# Patient Record
Sex: MALE | Employment: OTHER
[De-identification: names, ages, dates, MRNs, and addresses within clinical notes are randomized per-mention and may not be internally consistent; named-entity substitution may affect disease eponyms.]

---

## 2024-05-30 ENCOUNTER — TELEPHONE (OUTPATIENT)
Dept: SLEEP MEDICINE | Facility: HOSPITAL | Age: 29
End: 2024-05-30

## 2024-05-30 ENCOUNTER — OFFICE VISIT (OUTPATIENT)
Age: 29
End: 2024-05-30
Payer: OTHER GOVERNMENT

## 2024-05-30 VITALS
SYSTOLIC BLOOD PRESSURE: 150 MMHG | DIASTOLIC BLOOD PRESSURE: 60 MMHG | TEMPERATURE: 97 F | RESPIRATION RATE: 18 BRPM | HEART RATE: 68 BPM | HEIGHT: 77 IN | WEIGHT: 251 LBS | OXYGEN SATURATION: 99 % | BODY MASS INDEX: 29.64 KG/M2

## 2024-05-30 DIAGNOSIS — G47.19 EXCESSIVE DAYTIME SLEEPINESS: ICD-10-CM

## 2024-05-30 DIAGNOSIS — R06.83 LOUD SNORING: ICD-10-CM

## 2024-05-30 DIAGNOSIS — R03.0 ELEVATED BLOOD PRESSURE READING: ICD-10-CM

## 2024-05-30 DIAGNOSIS — G47.00 INSOMNIA W/ SLEEP APNEA: ICD-10-CM

## 2024-05-30 DIAGNOSIS — R35.1 NOCTURIA: ICD-10-CM

## 2024-05-30 DIAGNOSIS — R73.03 PREDIABETES: ICD-10-CM

## 2024-05-30 DIAGNOSIS — G47.30 INSOMNIA W/ SLEEP APNEA: ICD-10-CM

## 2024-05-30 DIAGNOSIS — R29.818 SUSPECTED SLEEP APNEA: Primary | ICD-10-CM

## 2024-05-30 PROCEDURE — 99204 OFFICE O/P NEW MOD 45 MIN: CPT | Performed by: OTOLARYNGOLOGY

## 2024-05-30 RX ORDER — NAPROXEN 500 MG/1
TABLET ORAL
COMMUNITY
Start: 2023-12-13 | End: 2024-12-12

## 2024-05-30 RX ORDER — RIZATRIPTAN BENZOATE 10 MG/1
TABLET ORAL
COMMUNITY
Start: 2023-12-13 | End: 2024-12-12

## 2024-05-30 RX ORDER — SOD CHLOR,BICARB/SQUEEZ BOTTLE
PACKET, WITH RINSE DEVICE NASAL
COMMUNITY
Start: 2024-05-17

## 2024-05-30 RX ORDER — FEXOFENADINE HCL 180 MG/1
TABLET ORAL
COMMUNITY
Start: 2023-09-19 | End: 2024-10-26

## 2024-05-30 RX ORDER — AZELASTINE 1 MG/ML
SPRAY, METERED NASAL
COMMUNITY
Start: 2024-05-08 | End: 2025-05-08

## 2024-05-30 RX ORDER — FLUTICASONE PROPIONATE 50 MCG
SPRAY, SUSPENSION (ML) NASAL
COMMUNITY
Start: 2023-10-27 | End: 2024-10-26

## 2024-05-30 ASSESSMENT — SLEEP AND FATIGUE QUESTIONNAIRES
HOW LIKELY ARE YOU TO NOD OFF OR FALL ASLEEP WHILE WATCHING TV: HIGH CHANCE OF DOZING
HOW LIKELY ARE YOU TO NOD OFF OR FALL ASLEEP WHILE SITTING AND READING: HIGH CHANCE OF DOZING
HOW LIKELY ARE YOU TO NOD OFF OR FALL ASLEEP WHILE SITTING INACTIVE IN A PUBLIC PLACE: HIGH CHANCE OF DOZING
HOW LIKELY ARE YOU TO NOD OFF OR FALL ASLEEP IN A CAR, WHILE STOPPED FOR A FEW MINUTES IN TRAFFIC: HIGH CHANCE OF DOZING
HOW LIKELY ARE YOU TO NOD OFF OR FALL ASLEEP WHEN YOU ARE A PASSENGER IN A CAR FOR AN HOUR WITHOUT A BREAK: HIGH CHANCE OF DOZING
HOW LIKELY ARE YOU TO NOD OFF OR FALL ASLEEP WHILE LYING DOWN TO REST IN THE AFTERNOON WHEN CIRCUMSTANCES PERMIT: HIGH CHANCE OF DOZING
NECK CIRCUMFERENCE (INCHES): 15
HOW LIKELY ARE YOU TO NOD OFF OR FALL ASLEEP WHILE SITTING AND TALKING TO SOMEONE: WOULD NEVER DOZE
ESS TOTAL SCORE: 21
HOW LIKELY ARE YOU TO NOD OFF OR FALL ASLEEP WHILE SITTING QUIETLY AFTER LUNCH WITHOUT ALCOHOL: HIGH CHANCE OF DOZING

## 2024-05-30 ASSESSMENT — PATIENT HEALTH QUESTIONNAIRE - PHQ9
SUM OF ALL RESPONSES TO PHQ QUESTIONS 1-9: 0
SUM OF ALL RESPONSES TO PHQ9 QUESTIONS 1 & 2: 0
2. FEELING DOWN, DEPRESSED OR HOPELESS: NOT AT ALL
SUM OF ALL RESPONSES TO PHQ QUESTIONS 1-9: 0
1. LITTLE INTEREST OR PLEASURE IN DOING THINGS: NOT AT ALL
SUM OF ALL RESPONSES TO PHQ QUESTIONS 1-9: 0
SUM OF ALL RESPONSES TO PHQ QUESTIONS 1-9: 0

## 2024-05-30 NOTE — PROGRESS NOTES
pap smears or colon screening. no    Medication list has been updated according to patient.  
spent another 5 to 10 minutes writing a comprehensive note.  I also ordered a home sleep apnea test to evaluate for yet undiagnosed but suspected moderate to severe obstructive sleep apnea.    This note was dictated utilizing voice recognition software which may lead to typographical errors.  I apologize in advance if the situation occurs.  If questions arise please do not hesitate to contact me or call our department.    Electronically signed by Kiah Harding DO on5/30/2024 at 1:24 PM

## 2024-05-30 NOTE — ASSESSMENT & PLAN NOTE
obviously this could be due to untreated moderate to severe obstructive sleep apnea but also shiftwork sleep disorder.  His shift does very for 2 months at a time and it takes him a while to fall asleep even with an [place] sleep score reportedly of 21.  I had him fill out the [place] sleep score by himself after I was done speaking to him and his score was 18 at that point.

## 2024-05-30 NOTE — ASSESSMENT & PLAN NOTE
interestingly, even after having an [place] somewhere between 18 and 21 it may take him 40 minutes or so to fall asleep when he goes to bed at 2 or 3 in the morning.  He does not \"work out\" after he gets done with his shift which we need to revisit as this may not be a great idea.  Additionally, sleep onset insomnia will improve a lot of times when treating moderate to severe obstructive sleep apnea.  He also could have his shiftwork sleep disorder but he should be able to fall asleep faster than 40 minutes at 2:00 in the morning based on circadian rhythm.    Will need to revisit this in follow-up

## 2024-05-30 NOTE — PATIENT INSTRUCTIONS
Please make a follow up appointment to discuss the results of your sleep study. If this is impossible for some reason, please send me a \"My Chart\" message so that I may get back with you in a timely manner.    The [facility] is located in the [facility], adjacent to [facility]. The lab is on the second floor. The direct number to call for sleep study related questions is: [phone].    Please call our clinic back at [phone] or send a message on [employer] if you have any questions or concerns or if you are experiencing any of the following:     You have not received a follow up appointment within 30 days prior the recommended follow up time.    If you are not tolerating treatment plan and/or not able to obtain equipment or prescribed medication(s).  if you are experiencing any difficulties with the Durable Medical Equipment  (DME) Company you may be using or is assigned to you.  Two weeks have passed and you have not received an appointment for a scheduled procedure.  Two weeks have passed since you underwent a test and/or procedure and you have not received your results.     If you are using a CPAP/BIPAP, or Home Ventilator Device- Please note the following.  Currently, many DMEs are experiencing supply chain difficulties and orders for equipment may be back logged several weeks.     Your  Durable Medical Equipment (DME ) company is supposed to provide you with replacement filters, tubing and masks. You can either call your DME when you need new supplies or you can arrange for an automatic shipment schedule.    Your need to be seen by our office at lat minimum of every 12 months in order to renew the prescription for these supplies.   Please make note of who your DME company is and their phone number.   Please make sure that you clean your mask and hosing on a regular basis.  Your DME can provide you with additional information regarding proper care and cleaning of your

## 2024-06-03 ENCOUNTER — TELEPHONE (OUTPATIENT)
Dept: SLEEP MEDICINE | Facility: HOSPITAL | Age: 29
End: 2024-06-03

## 2024-06-04 ENCOUNTER — TELEPHONE (OUTPATIENT)
Dept: SLEEP MEDICINE | Facility: HOSPITAL | Age: 29
End: 2024-06-04

## 2024-06-10 ENCOUNTER — TELEPHONE (OUTPATIENT)
Dept: SLEEP MEDICINE | Facility: HOSPITAL | Age: 29
End: 2024-06-10

## 2024-06-17 ENCOUNTER — HOSPITAL ENCOUNTER (OUTPATIENT)
Dept: SLEEP MEDICINE | Facility: HOSPITAL | Age: 29
Discharge: HOME | End: 2024-06-20
Attending: OTOLARYNGOLOGY
Payer: OTHER GOVERNMENT

## 2024-06-17 DIAGNOSIS — R29.818 SUSPECTED SLEEP APNEA: ICD-10-CM

## 2024-06-17 DIAGNOSIS — G47.00 INSOMNIA W/ SLEEP APNEA: ICD-10-CM

## 2024-06-17 DIAGNOSIS — R06.83 LOUD SNORING: ICD-10-CM

## 2024-06-17 DIAGNOSIS — G47.19 EXCESSIVE DAYTIME SLEEPINESS: ICD-10-CM

## 2024-06-17 DIAGNOSIS — G47.30 INSOMNIA W/ SLEEP APNEA: ICD-10-CM

## 2024-06-17 PROCEDURE — 95800 SLP STDY UNATTENDED: CPT

## 2024-06-19 PROBLEM — G47.33 OSA (OBSTRUCTIVE SLEEP APNEA): Status: ACTIVE | Noted: 2024-06-19

## 2024-06-21 ENCOUNTER — CLINICAL DOCUMENTATION (OUTPATIENT)
Age: 29
End: 2024-06-21

## 2024-06-24 ENCOUNTER — OFFICE VISIT (OUTPATIENT)
Age: 29
End: 2024-06-24
Payer: OTHER GOVERNMENT

## 2024-06-24 VITALS
TEMPERATURE: 97.8 F | SYSTOLIC BLOOD PRESSURE: 147 MMHG | OXYGEN SATURATION: 99 % | RESPIRATION RATE: 18 BRPM | HEART RATE: 72 BPM | WEIGHT: 257 LBS | BODY MASS INDEX: 30.34 KG/M2 | HEIGHT: 77 IN | DIASTOLIC BLOOD PRESSURE: 73 MMHG

## 2024-06-24 DIAGNOSIS — R73.03 PREDIABETES: ICD-10-CM

## 2024-06-24 DIAGNOSIS — R06.83 LOUD SNORING: ICD-10-CM

## 2024-06-24 DIAGNOSIS — G47.00 INSOMNIA W/ SLEEP APNEA: ICD-10-CM

## 2024-06-24 DIAGNOSIS — G47.33 MODERATE OBSTRUCTIVE SLEEP APNEA: Primary | ICD-10-CM

## 2024-06-24 DIAGNOSIS — R35.1 NOCTURIA: ICD-10-CM

## 2024-06-24 DIAGNOSIS — G47.30 INSOMNIA W/ SLEEP APNEA: ICD-10-CM

## 2024-06-24 DIAGNOSIS — R03.0 ELEVATED BLOOD PRESSURE READING: ICD-10-CM

## 2024-06-24 DIAGNOSIS — G47.19 EXCESSIVE DAYTIME SLEEPINESS: ICD-10-CM

## 2024-06-24 PROCEDURE — 99214 OFFICE O/P EST MOD 30 MIN: CPT | Performed by: OTOLARYNGOLOGY

## 2024-06-24 ASSESSMENT — SLEEP AND FATIGUE QUESTIONNAIRES
HOW LIKELY ARE YOU TO NOD OFF OR FALL ASLEEP WHILE LYING DOWN TO REST IN THE AFTERNOON WHEN CIRCUMSTANCES PERMIT: HIGH CHANCE OF DOZING
HOW LIKELY ARE YOU TO NOD OFF OR FALL ASLEEP WHILE SITTING INACTIVE IN A PUBLIC PLACE: HIGH CHANCE OF DOZING
ESS TOTAL SCORE: 22
HOW LIKELY ARE YOU TO NOD OFF OR FALL ASLEEP WHILE WATCHING TV: HIGH CHANCE OF DOZING
HOW LIKELY ARE YOU TO NOD OFF OR FALL ASLEEP IN A CAR, WHILE STOPPED FOR A FEW MINUTES IN TRAFFIC: MODERATE CHANCE OF DOZING
HOW LIKELY ARE YOU TO NOD OFF OR FALL ASLEEP WHILE SITTING AND TALKING TO SOMEONE: MODERATE CHANCE OF DOZING
HOW LIKELY ARE YOU TO NOD OFF OR FALL ASLEEP WHILE SITTING AND READING: HIGH CHANCE OF DOZING
HOW LIKELY ARE YOU TO NOD OFF OR FALL ASLEEP WHILE SITTING QUIETLY AFTER LUNCH WITHOUT ALCOHOL: HIGH CHANCE OF DOZING
HOW LIKELY ARE YOU TO NOD OFF OR FALL ASLEEP WHEN YOU ARE A PASSENGER IN A CAR FOR AN HOUR WITHOUT A BREAK: HIGH CHANCE OF DOZING

## 2024-06-24 ASSESSMENT — PATIENT HEALTH QUESTIONNAIRE - PHQ9
SUM OF ALL RESPONSES TO PHQ QUESTIONS 1-9: 0
1. LITTLE INTEREST OR PLEASURE IN DOING THINGS: NOT AT ALL
SUM OF ALL RESPONSES TO PHQ QUESTIONS 1-9: 0
SUM OF ALL RESPONSES TO PHQ QUESTIONS 1-9: 0
SUM OF ALL RESPONSES TO PHQ9 QUESTIONS 1 & 2: 0
2. FEELING DOWN, DEPRESSED OR HOPELESS: NOT AT ALL
SUM OF ALL RESPONSES TO PHQ QUESTIONS 1-9: 0

## 2024-06-24 NOTE — ASSESSMENT & PLAN NOTE
Interestingly, even after having an [place] somewhere between 18 and 21 it may take him 40 minutes or so to fall asleep when he goes to bed at 2 or 3 in the morning.  He does not \"work out\" after he gets done with his shift which we need to revisit as this may not be a great idea.  Additionally, sleep onset insomnia will improve a lot of times when treating moderate to severe obstructive sleep apnea.  He also could have his shiftwork sleep disorder but he should be able to fall asleep faster than 40 minutes at 2:00 in the morning based on circadian rhythm.

## 2024-06-24 NOTE — PROGRESS NOTES
Krystian High presents today for   Chief Complaint   Patient presents with    Follow-up    Results       Is someone accompanying this pt? no    Is the patient using any DME equipment during OV? no    -DME Company: N/A    Depression Screenin/24/2024     8:28 AM   PHQ-9    Little interest or pleasure in doing things 0   Feeling down, depressed, or hopeless 0   PHQ-2 Score 0   PHQ-9 Total Score 0        [place] Sleepiness Scale:      2024     8:30 AM   Sleep Medicine   Sitting and reading 3   Watching TV 3   Sitting, inactive in a public place (e.g. a theatre or a meeting) 3   As a passenger in a car for an hour without a break 3   Lying down to rest in the afternoon when circumstances permit 3   Sitting and talking to someone 2   Sitting quietly after a lunch without alcohol 3   In a car, while stopped for a few minutes in traffic 2   [place] Sleepiness Score 22       Stop-Ban/30/2024    12:00 PM   STOP-BANG QUESTIONNAIRE   Are you a loud and/or regular snorer? 1   Do you often feel tired or groggy upon awakening or do you awaken with a headache? 1   Have you been observed to gasp or stop breathing during sleep? 0   Are you often tired or fatigued during wake time hours?  0   Do you fall asleep sitting, reading, watching TV or driving? 0   Do you often have problems with memory or concentration? 0   Do you have or are you being treated for high blood pressure? 0   Recent BMI (Calculated) 0   Age older than 50 years old? 0=No   Is your neck circumference greater than 17 inches (Male) or 16 inches (Female)? 0   Gender - Male 1=Yes           Coordination of Care:  1. Have you been to the ER, urgent care clinic since your last visit? Hospitalized since your last visit?  no    2. Have you seen or consulted any other health care providers outside of the [facility] since your last visit? Include any pap smears or colon screening. no    Medication list has been updated according to 
discussed other possible options for treatment to include weight loss, oral appliances and optimization of nasal patency or other surgical options that could be considered if applicable.      I recommend we start AutoPAP (APAP) and have the patient follow-up in the office 6 to 8 weeks after getting and starting to use the machine. We briefly discussed how the machine works and what the definition of compliance is as well.    We also reviewed the risks of driving while sleepy and discussed the fact that the patient should have a plan to combat or prevent driving while excessively sleepy.    Patient asked appropriate questions would like to move forward with treating the obstructive sleep apnea with CPAP (APAP).         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  Additionally, he works third shift which is generally 1:30 PM to 11 PM or midnight.  It would be difficult for him to try to fall asleep in the middle of his \"workday\", 830 or 9 PM etc.    We did discuss not driving sleepy and how weight gain/weight loss can affect obstructive sleep apnea.      Plan:   As an active duty member, he really qualifies for CPAP/APAP and an oral appliance so I will place an Rx/consult for both.    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    He also complains about sleep onset insomnia.  We discussed the fact that once we resolve moderate to severe obstructive sleep apnea sometimes this dramatically gets better immediately.  Unfortunately I will be able to see him back until after he returns from his deployment but at least I can treat his

## 2024-06-24 NOTE — PATIENT INSTRUCTIONS
device     Safety is strongly encouraged.  You should not drive if sleepy, tired, distracted and/or fatigued.      Chest Xrays and blood work do not require appointments.  They are considered \"Walk-In\" services and can be obtained at either [facility] or [facility].  Blood work is performed at the Laboratory (Lab) from 08:00am - 04:00 pm (if applicable to your visit)

## 2024-06-26 ENCOUNTER — TELEPHONE (OUTPATIENT)
Age: 29
End: 2024-06-26

## 2024-06-26 NOTE — TELEPHONE ENCOUNTER
Pt called about his order for his cpap machine & his oral appliance. I see the orders in the system but he states no one has called him in reference to the orders.

## 2024-07-08 ENCOUNTER — CLINICAL DOCUMENTATION (OUTPATIENT)
Age: 29
End: 2024-07-08

## 2024-07-08 NOTE — PROGRESS NOTES
[facility] [employer] [facility]:   Order sent to [facility] 7-2-2024.    External referral to dentistry sent to Dr. Trinidad 7-2-2024.

## 2024-08-07 ENCOUNTER — TELEPHONE (OUTPATIENT)
Age: 29
End: 2024-08-07

## 2025-09-03 ENCOUNTER — CLINICAL DOCUMENTATION (OUTPATIENT)
Age: 30
End: 2025-09-03